# Patient Record
Sex: MALE | Race: BLACK OR AFRICAN AMERICAN | NOT HISPANIC OR LATINO | URBAN - METROPOLITAN AREA
[De-identification: names, ages, dates, MRNs, and addresses within clinical notes are randomized per-mention and may not be internally consistent; named-entity substitution may affect disease eponyms.]

---

## 2018-07-08 ENCOUNTER — EMERGENCY (EMERGENCY)
Facility: HOSPITAL | Age: 43
LOS: 1 days | Discharge: HOME | End: 2018-07-08
Attending: EMERGENCY MEDICINE | Admitting: EMERGENCY MEDICINE
Payer: COMMERCIAL

## 2018-07-08 VITALS
RESPIRATION RATE: 18 BRPM | SYSTOLIC BLOOD PRESSURE: 113 MMHG | TEMPERATURE: 98 F | DIASTOLIC BLOOD PRESSURE: 75 MMHG | HEART RATE: 73 BPM | OXYGEN SATURATION: 97 %

## 2018-07-08 DIAGNOSIS — R41.82 ALTERED MENTAL STATUS, UNSPECIFIED: ICD-10-CM

## 2018-07-08 DIAGNOSIS — F10.120 ALCOHOL ABUSE WITH INTOXICATION, UNCOMPLICATED: ICD-10-CM

## 2018-07-08 DIAGNOSIS — R46.0 VERY LOW LEVEL OF PERSONAL HYGIENE: ICD-10-CM

## 2018-07-08 PROCEDURE — 99284 EMERGENCY DEPT VISIT MOD MDM: CPT | Mod: 25

## 2018-07-08 PROCEDURE — 70450 CT HEAD/BRAIN W/O DYE: CPT | Mod: 26

## 2018-07-08 NOTE — ED PROVIDER NOTE - ENMT, MLM
Airway patent, Nasal mucosa clear. Mouth with normal mucosa. Throat has no vesicles, no oropharyngeal exudates and uvula is midline. No Matthews's/raccoon's.

## 2018-07-08 NOTE — ED ADULT NURSE NOTE - OBJECTIVE STATEMENT
teresa brewer[place] awakens to loud stimuli and drifts off to sleep quickly, no s/s of distress, will continue to monitor

## 2018-07-08 NOTE — ED PROVIDER NOTE - PROGRESS NOTE DETAILS
Pt somnolent, arousable to voice, will continue to observe pending clinical sobriety. S/O to Dr. Castillo at 0800.

## 2018-07-08 NOTE — ED PROVIDER NOTE - MEDICAL DECISION MAKING DETAILS
will observe pending clinical sobriety. will observe pending clinical sobriety.    Strict prompt return precautions to ER discussed for any worsening or new sypmtoms, pt verbalized understanding. The patient tolerated PO fluids. The patient's symptoms progressively improved throughout the ED stay. At the time of discharge from the Emergency Department, the patient is alert with fluent appropriate speech and ambulatory without difficulty.  A medical screening examination was performed and no emergency medical condition was identified.

## 2018-09-23 ENCOUNTER — EMERGENCY (EMERGENCY)
Facility: HOSPITAL | Age: 43
LOS: 1 days | Discharge: HOME | End: 2018-09-23
Attending: EMERGENCY MEDICINE | Admitting: EMERGENCY MEDICINE
Payer: COMMERCIAL

## 2018-09-23 VITALS
OXYGEN SATURATION: 100 % | DIASTOLIC BLOOD PRESSURE: 97 MMHG | SYSTOLIC BLOOD PRESSURE: 137 MMHG | RESPIRATION RATE: 18 BRPM | HEART RATE: 82 BPM | TEMPERATURE: 98 F

## 2018-09-23 VITALS
TEMPERATURE: 98 F | OXYGEN SATURATION: 99 % | HEART RATE: 88 BPM | SYSTOLIC BLOOD PRESSURE: 115 MMHG | DIASTOLIC BLOOD PRESSURE: 85 MMHG | RESPIRATION RATE: 16 BRPM

## 2018-09-23 DIAGNOSIS — R41.82 ALTERED MENTAL STATUS, UNSPECIFIED: ICD-10-CM

## 2018-09-23 DIAGNOSIS — F10.129 ALCOHOL ABUSE WITH INTOXICATION, UNSPECIFIED: ICD-10-CM

## 2018-09-23 PROCEDURE — 99236 HOSP IP/OBS SAME DATE HI 85: CPT

## 2018-09-23 NOTE — ED CDU PROVIDER DISPOSITION NOTE - CLINICAL COURSE
Patient BIBA for public intoxication.  Slept, awoke, ate, walked around.  No trauma.  No complaints.  Requesting discharge.  Ambulatory with steady gait and balance.  Alert/oriented x 4.

## 2018-09-23 NOTE — ED ADULT NURSE NOTE - NSIMPLEMENTINTERV_GEN_ALL_ED
Implemented All Fall Risk Interventions:  [place] to call system. Call bell, personal items and telephone within reach. Instruct patient to call for assistance. Room bathroom lighting operational. Non-slip footwear when patient is off stretcher. Physically safe environment: no spills, clutter or unnecessary equipment. Stretcher in lowest position, wheels locked, appropriate side rails in place. Provide visual cue, wrist band, yellow gown, etc. Monitor gait and stability. Monitor for mental status changes and reorient to person, place, and time. Review medications for side effects contributing to fall risk. Reinforce activity limits and safety measures with patient and family.

## 2018-09-23 NOTE — ED ADULT NURSE NOTE - WEIGHT IN LBS
Refill Authorization Note     is requesting a refill authorization.    Brief assessment and rationale for refill: QUICK DC: both RTS 2/19  Amount/Quantity of medication ordered: 90d        Refills Authorized: No              Medication Therapy Plan: quick dc both RTS 2/19  Name and strength of medication: CLOPIDOGREL 75 MG Tablet/LOSARTAN POTASSIUM 100 MG Tablet  How patient will take medication: utd  Medication reconciliation completed: No  Comments:   
179.8

## 2018-09-23 NOTE — ED PROVIDER NOTE - OBJECTIVE STATEMENT
alcohol intoxication BIBEMS, patient is unable to stand without assistance, is unsafe at this time and will admit to CDU and offer SBIRT services when sober

## 2018-09-23 NOTE — ED ADULT NURSE NOTE - CHPI ED NUR SYMPTOMS NEG
no chills/no decreased eating/drinking/no dizziness/no fever/no nausea/no pain/no tingling/no vomiting/no weakness

## 2018-09-23 NOTE — ED ADULT NURSE NOTE - OBJECTIVE STATEMENT
here for AMS, found on the street drinking- vitals stable, presents with no obvious injury or trauma

## 2020-02-29 ENCOUNTER — EMERGENCY (EMERGENCY)
Facility: HOSPITAL | Age: 45
LOS: 1 days | Discharge: HOME | End: 2020-02-29
Admitting: EMERGENCY MEDICINE
Payer: COMMERCIAL

## 2020-02-29 VITALS
SYSTOLIC BLOOD PRESSURE: 128 MMHG | TEMPERATURE: 98 F | HEART RATE: 69 BPM | RESPIRATION RATE: 16 BRPM | HEIGHT: 74 IN | DIASTOLIC BLOOD PRESSURE: 93 MMHG | OXYGEN SATURATION: 97 % | WEIGHT: 198.42 LBS

## 2020-02-29 PROCEDURE — 99283 EMERGENCY DEPT VISIT LOW MDM: CPT

## 2020-02-29 PROCEDURE — 73110 X-RAY EXAM OF WRIST: CPT | Mod: 26,LT

## 2020-02-29 NOTE — ED ADULT NURSE NOTE - SUICIDE SCREENING QUESTION 1
Other (Free Text): Declines rx Note Text (......Xxx Chief Complaint.): This diagnosis correlates with the Detail Level: Detailed Patient refused

## 2020-02-29 NOTE — ED ADULT NURSE NOTE - OBJECTIVE STATEMENT
45 y/o M c/o L wrist pain. Pt very uncooperative during exam and changing story of how injury occurred. Pt told triage RN he fell onto subway tracks and is now telling RN he bumped his wrist into the wall. No obvious swelling/deformity. Pt unwilling to answer questions and provide PMH. Pt very lethargic and irritable.

## 2020-02-29 NOTE — ED PROVIDER NOTE - PHYSICAL EXAMINATION
VITAL SIGNS: I have reviewed nursing notes and confirm.  CONSTITUTIONAL: Well-developed; well-nourished; in no acute distress.  SKIN: Skin is warm and dry, no acute rash.  HEAD: Normocephalic; atraumatic.  EYES: PERRL, EOM intact; conjunctiva and sclera clear.  ENT: No nasal discharge; airway clear.  NECK: Supple; non tender.  CARD: S1, S2 normal; no murmurs, gallops, or rubs. Regular rate and rhythm.  RESP: No wheezes, rales or rhonchi.  ABD: Normal bowel sounds; soft; non-distended; non-tender;  no palpable or pulsating mass; no hepatosplenomegaly.  EXT: left wrist: no swelling, no erythema, ROM intact, NVI, no scaphoid tenderness  OTHER: Normal ROM. No clubbing, cyanosis or edema.  NEURO: Patient is alert, oriented x person, place and time.  Cranial nerves 2-12 are intact.  Normal gait and speech.  Cerebellar testing normal:  negative Romberg, normal coordination and normal finger to nose, heal to shin and rapid alternating movements.  Normal proprioception and sensory exam.  No pronator drift.  5/5 bl upper extremity and lower extremity strength.  PSYCH: Cooperative, appropriate.

## 2020-02-29 NOTE — ED PROVIDER NOTE - PATIENT PORTAL LINK FT
You can access the FollowMyHealth Patient Portal offered by [facility] by registering at the following website: [email or web address]. By joining [employer]’s FollowMyHealth portal, you will also be able to view your health information using other applications (apps) compatible with our system.

## 2020-02-29 NOTE — ED PROVIDER NOTE - OBJECTIVE STATEMENT
44 year old male with no PMhx presents with left wrist pain x couple hours. reports "fell on train tracks." also just states he wants to sleep.   Denies head trauma, LOC, break in the skin, paresthesia, numbness, tingling, redness, bleeding, d/c, HA, dizziness, SOB, CP, palpitations, N/V, focal weakness, neck/back pain, and malaise. Pt is ambulatory s/p fall
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
## 2020-03-04 DIAGNOSIS — M25.532 PAIN IN LEFT WRIST: ICD-10-CM
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
## 2020-03-04 DIAGNOSIS — S60.212A CONTUSION OF LEFT WRIST, INITIAL ENCOUNTER: ICD-10-CM
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
## 2022-03-25 ENCOUNTER — EMERGENCY (EMERGENCY)
Facility: HOSPITAL | Age: 47
LOS: 1 days | Discharge: HOME | End: 2022-03-25
Attending: EMERGENCY MEDICINE
Payer: SELF-PAY

## 2022-03-25 VITALS
OXYGEN SATURATION: 94 % | SYSTOLIC BLOOD PRESSURE: 166 MMHG | DIASTOLIC BLOOD PRESSURE: 104 MMHG | RESPIRATION RATE: 18 BRPM | HEART RATE: 68 BPM | WEIGHT: 199.96 LBS

## 2022-03-25 VITALS
RESPIRATION RATE: 17 BRPM | DIASTOLIC BLOOD PRESSURE: 69 MMHG | OXYGEN SATURATION: 100 % | TEMPERATURE: 98 F | HEART RATE: 70 BPM | SYSTOLIC BLOOD PRESSURE: 104 MMHG

## 2022-03-25 PROCEDURE — 99285 EMERGENCY DEPT VISIT HI MDM: CPT

## 2022-03-25 PROCEDURE — 82962 GLUCOSE BLOOD TEST: CPT

## 2022-03-25 PROCEDURE — 93005 ELECTROCARDIOGRAM TRACING: CPT

## 2022-03-25 PROCEDURE — 99284 EMERGENCY DEPT VISIT MOD MDM: CPT

## 2022-03-25 NOTE — ED ADULT NURSE NOTE - NSIMPLEMENTINTERV_GEN_ALL_ED
Implemented All Fall Risk Interventions:  [place] to call system. Call bell, personal items and telephone within reach. Instruct patient to call for assistance. Room bathroom lighting operational. Non-slip footwear when patient is off stretcher. Physically safe environment: no spills, clutter or unnecessary equipment. Stretcher in lowest position, wheels locked, appropriate side rails in place. Provide visual cue, wrist band, yellow gown, etc. Monitor gait and stability. Monitor for mental status changes and reorient to person, place, and time. Review medications for side effects contributing to fall risk. Reinforce activity limits and safety measures with patient and family.

## 2022-03-25 NOTE — ED PROVIDER NOTE - PATIENT PORTAL LINK FT
You can access the FollowMyHealth Patient Portal offered by [facility] by registering at the following website: [email or web address]. By joining [employer]’s FollowMyHealth portal, you will also be able to view your health information using other applications (apps) compatible with our system.

## 2022-03-25 NOTE — ED PROVIDER NOTE - CLINICAL SUMMARY MEDICAL DECISION MAKING FREE TEXT BOX
47y/o M w/ unknown medical history found unresponsive smelling of alcohol not alert unable to walk but became acutely agitated requiring sedation. Pupils 4mm and reactive to light, fits Alcohol toxidrome. Plan finger stick, metabolize to freedom, reassess, ambulate and obtain additional history when sober 45y/o M w/ unknown medical history found unresponsive smelling of alcohol not alert unable to walk but became acutely agitated requiring sedation. Pupils 4mm and reactive to light, fits alcohol toxidrome. Plan finger stick, metabolize to freedom, reassess, ambulate and obtain additional history when sober.

## 2022-03-25 NOTE — ED PROVIDER NOTE - PHYSICAL EXAMINATION
Gen: non-toxic appearing  Head: normal appearing  HEENT: normal conjunctiva, oral mucosa moist  Lung: no respiratory distress, CTA b/l     CV: regular rate and rhythm, no murmurs  Abd: soft, non distended, non tender   MSK: no visible deformities  Neuro: No focal deficits, AAOx3  Skin: Warm  Psych: normal affect Gen: non-toxic appearing  Head: normal appearing  HEENT: normal conjunctiva, oral mucosa moist  Lung: no respiratory distress, CTA b/l  CV: regular rate and rhythm, no murmurs  Abd: soft, non distended, non tender   MSK: no visible deformities  Neuro: No focal deficits, PERRL, AAOx0  Skin: Warm  Psych: normal affect

## 2022-03-25 NOTE — ED ADULT NURSE NOTE - OBJECTIVE STATEMENT
Pt is a 46 yr old male with unknown medical hx brought in by ems for intox. Pt was apparently found outside- intoxicated and combative with ems/PD. PD cuffed the pt and ems gave him 5 IM Versed. Pt came into the ED asleep from medications but still arousable to painful stimuli. Pt placed on 2L NC and placed on enhanced supervision.

## 2022-03-25 NOTE — ED PROVIDER NOTE - ATTENDING CONTRIBUTION TO CARE
attending Krys: 46yM BIBEMS after being found down at work, smelling of alcohol. After EMS arrival, pt became acutely agitated, required 5mg intranasal versed for sedation at 19:30. No evidence of trauma.  Exam as above. Will obtain finger stick, place on enhanced supervision, observe for sobriety

## 2022-03-25 NOTE — ED ADULT NURSE REASSESSMENT NOTE - NS ED NURSE REASSESS COMMENT FT1
Report received from KARIME Cook. Patient resting in stretcher in green hallway 29.5. A&Ox4. Patient sleeping but easily arousable. No IV placed. Denies any pain. Stretcher in lowest position, side rails up. Patient instructed to notify RN if assistance is needed. Awaiting disposition.

## 2022-03-25 NOTE — ED PROVIDER NOTE - PROGRESS NOTE DETAILS
Case EM/IM PGY4: Pt reassessed, now awake and responsive. Ambulating without difficulty. Speaking fluently concerned about not having his phone. Refusing PO challenge at this time. Pt medically stabilized for discharge at this time with strict return precautions.

## 2022-03-25 NOTE — ED PROVIDER NOTE - NSFOLLOWUPINSTRUCTIONS_ED_ALL_ED_FT
You were brought to the hospital due to concern for alcohol intoxication. You were observed in the ER until you were awake and feeling better. Follow-up with your primary care doctor within a week to discuss your ER visit and for further evaluation and management. Return to the ER for any new or worsening fevers/chills, headache, vision changes, neck pain, back pain, chest pain, difficulty breathing, abdominal pain, vomiting/inability to eat, numbness, weakness or any other new or concerning symptoms.

## 2022-03-26 NOTE — ED ADULT NURSE REASSESSMENT NOTE - NS ED NURSE REASSESS COMMENT FT1
Patient awake and oriented to person, place, and time. Patient able to tolerate PO. Ambulating in ED with steady gait. As per MD Willoughby, patient is okay for discharge. Patient states that he doesn't have his 2 phones. As per KARIME Latham, patient did not arrive to ED with hoodie and 2 cellphones that he is looking for. Patient is discharged.

## 2023-02-02 NOTE — ED CDU PROVIDER INITIAL DAY NOTE - NSTIMEPROVIDERCAREINITIATE_GEN_ER
Requested Prescriptions     Pending Prescriptions Disp Refills    predniSONE (STERAPRED) 5 mg dose pack 21 Tablet 0     Sig: See administration instruction per 5mg dose pack    Last fill 10/26/22   Last visit 1/11/23   Next visit 4/5/23
23-Sep-2018 02:59

## 2023-07-25 ENCOUNTER — EMERGENCY (EMERGENCY)
Facility: HOSPITAL | Age: 48
LOS: 1 days | Discharge: HOME | End: 2023-07-25
Attending: EMERGENCY MEDICINE | Admitting: EMERGENCY MEDICINE
Payer: SELF-PAY

## 2023-07-25 VITALS
RESPIRATION RATE: 19 BRPM | HEART RATE: 63 BPM | DIASTOLIC BLOOD PRESSURE: 96 MMHG | OXYGEN SATURATION: 96 % | SYSTOLIC BLOOD PRESSURE: 150 MMHG | WEIGHT: 184.09 LBS | TEMPERATURE: 98 F

## 2023-07-25 VITALS
SYSTOLIC BLOOD PRESSURE: 148 MMHG | DIASTOLIC BLOOD PRESSURE: 88 MMHG | OXYGEN SATURATION: 98 % | HEART RATE: 67 BPM | RESPIRATION RATE: 16 BRPM

## 2023-07-25 DIAGNOSIS — R10.13 EPIGASTRIC PAIN: ICD-10-CM

## 2023-07-25 DIAGNOSIS — R10.12 LEFT UPPER QUADRANT PAIN: ICD-10-CM

## 2023-07-25 DIAGNOSIS — Z88.6 ALLERGY STATUS TO ANALGESIC AGENT: ICD-10-CM

## 2023-07-25 DIAGNOSIS — F17.200 NICOTINE DEPENDENCE, UNSPECIFIED, UNCOMPLICATED: ICD-10-CM

## 2023-07-25 DIAGNOSIS — Z88.0 ALLERGY STATUS TO PENICILLIN: ICD-10-CM

## 2023-07-25 LAB
ALBUMIN SERPL ELPH-MCNC: 3.6 G/DL — SIGNIFICANT CHANGE UP (ref 3.4–5)
ALP SERPL-CCNC: 63 U/L — SIGNIFICANT CHANGE UP (ref 40–120)
ALT FLD-CCNC: 19 U/L — SIGNIFICANT CHANGE UP (ref 12–42)
ANION GAP SERPL CALC-SCNC: 10 MMOL/L — SIGNIFICANT CHANGE UP (ref 9–16)
AST SERPL-CCNC: 19 U/L — SIGNIFICANT CHANGE UP (ref 15–37)
BASOPHILS # BLD AUTO: 0.01 K/UL — SIGNIFICANT CHANGE UP (ref 0–0.2)
BASOPHILS NFR BLD AUTO: 0.2 % — SIGNIFICANT CHANGE UP (ref 0–2)
BILIRUB SERPL-MCNC: 0.5 MG/DL — SIGNIFICANT CHANGE UP (ref 0.2–1.2)
BUN SERPL-MCNC: 13 MG/DL — SIGNIFICANT CHANGE UP (ref 7–23)
CALCIUM SERPL-MCNC: 8.5 MG/DL — SIGNIFICANT CHANGE UP (ref 8.5–10.5)
CHLORIDE SERPL-SCNC: 104 MMOL/L — SIGNIFICANT CHANGE UP (ref 96–108)
CO2 SERPL-SCNC: 25 MMOL/L — SIGNIFICANT CHANGE UP (ref 22–31)
CREAT SERPL-MCNC: 0.93 MG/DL — SIGNIFICANT CHANGE UP (ref 0.5–1.3)
EGFR: 101 ML/MIN/1.73M2 — SIGNIFICANT CHANGE UP
EOSINOPHIL # BLD AUTO: 0.09 K/UL — SIGNIFICANT CHANGE UP (ref 0–0.5)
EOSINOPHIL NFR BLD AUTO: 2.2 % — SIGNIFICANT CHANGE UP (ref 0–6)
GLUCOSE SERPL-MCNC: 92 MG/DL — SIGNIFICANT CHANGE UP (ref 70–99)
HCT VFR BLD CALC: 38.5 % — LOW (ref 39–50)
HGB BLD-MCNC: 12.8 G/DL — LOW (ref 13–17)
IMM GRANULOCYTES NFR BLD AUTO: 0 % — SIGNIFICANT CHANGE UP (ref 0–0.9)
LIDOCAIN IGE QN: 86 U/L — SIGNIFICANT CHANGE UP (ref 73–393)
LYMPHOCYTES # BLD AUTO: 2.18 K/UL — SIGNIFICANT CHANGE UP (ref 1–3.3)
LYMPHOCYTES # BLD AUTO: 52.4 % — HIGH (ref 13–44)
MCHC RBC-ENTMCNC: 30.2 PG — SIGNIFICANT CHANGE UP (ref 27–34)
MCHC RBC-ENTMCNC: 33.2 GM/DL — SIGNIFICANT CHANGE UP (ref 32–36)
MCV RBC AUTO: 90.8 FL — SIGNIFICANT CHANGE UP (ref 80–100)
MONOCYTES # BLD AUTO: 0.34 K/UL — SIGNIFICANT CHANGE UP (ref 0–0.9)
MONOCYTES NFR BLD AUTO: 8.2 % — SIGNIFICANT CHANGE UP (ref 2–14)
NEUTROPHILS # BLD AUTO: 1.54 K/UL — LOW (ref 1.8–7.4)
NEUTROPHILS NFR BLD AUTO: 37 % — LOW (ref 43–77)
NRBC # BLD: 0 /100 WBCS — SIGNIFICANT CHANGE UP (ref 0–0)
PLATELET # BLD AUTO: 210 K/UL — SIGNIFICANT CHANGE UP (ref 150–400)
POTASSIUM SERPL-MCNC: 3.6 MMOL/L — SIGNIFICANT CHANGE UP (ref 3.5–5.3)
POTASSIUM SERPL-SCNC: 3.6 MMOL/L — SIGNIFICANT CHANGE UP (ref 3.5–5.3)
PROT SERPL-MCNC: 7 G/DL — SIGNIFICANT CHANGE UP (ref 6.4–8.2)
RBC # BLD: 4.24 M/UL — SIGNIFICANT CHANGE UP (ref 4.2–5.8)
RBC # FLD: 12.2 % — SIGNIFICANT CHANGE UP (ref 10.3–14.5)
SODIUM SERPL-SCNC: 139 MMOL/L — SIGNIFICANT CHANGE UP (ref 132–145)
TROPONIN I, HIGH SENSITIVITY RESULT: <4 NG/L — SIGNIFICANT CHANGE UP
WBC # BLD: 4.16 K/UL — SIGNIFICANT CHANGE UP (ref 3.8–10.5)
WBC # FLD AUTO: 4.16 K/UL — SIGNIFICANT CHANGE UP (ref 3.8–10.5)

## 2023-07-25 PROCEDURE — 99284 EMERGENCY DEPT VISIT MOD MDM: CPT

## 2023-07-25 RX ORDER — OMEPRAZOLE 10 MG/1
1 CAPSULE, DELAYED RELEASE ORAL
Qty: 28 | Refills: 0
Start: 2023-07-25 | End: 2023-08-07

## 2023-07-25 RX ORDER — SUCRALFATE 1 G
1 TABLET ORAL ONCE
Refills: 0 | Status: COMPLETED | OUTPATIENT
Start: 2023-07-25 | End: 2023-07-25

## 2023-07-25 RX ORDER — FAMOTIDINE 10 MG/ML
20 INJECTION INTRAVENOUS ONCE
Refills: 0 | Status: COMPLETED | OUTPATIENT
Start: 2023-07-25 | End: 2023-07-25

## 2023-07-25 RX ADMIN — Medication 30 MILLILITER(S): at 21:45

## 2023-07-25 RX ADMIN — Medication 1 GRAM(S): at 21:44

## 2023-07-25 RX ADMIN — FAMOTIDINE 20 MILLIGRAM(S): 10 INJECTION INTRAVENOUS at 21:44

## 2023-07-25 NOTE — ED PROVIDER NOTE - PROGRESS NOTE DETAILS
Patient states that his symptoms have improved. Will dc with RX. Conservative management discussed with the patient in detail.  Close PMD follow up encouraged.  Strict ED return instructions discussed in detail and patient given the opportunity to ask any questions about their discharge diagnosis and instructions

## 2023-07-25 NOTE — ED PROVIDER NOTE - PHYSICAL EXAMINATION
PE: Well-appearing, no acute distress, nonlabored respirations, abdomen soft/nontender/nondistended.

## 2023-07-25 NOTE — ED ADULT NURSE NOTE - NSFALLUNIVINTERV_ED_ALL_ED
Bed/Stretcher in lowest position, wheels locked, appropriate side rails in place/Call bell, personal items and telephone in reach/Instruct patient to call for assistance before getting out of bed/chair/stretcher/Non-slip footwear applied when patient is off stretcher/[place] to call system/Physically safe environment - no spills, clutter or unnecessary equipment/Purposeful proactive rounding/Room/bathroom lighting operational, light cord in reach

## 2023-07-25 NOTE — ED PROVIDER NOTE - PATIENT PORTAL LINK FT
You can access the FollowMyHealth Patient Portal offered by [facility] by registering at the following website: [email or web address]. By joining [employer]’s FollowMyHealth portal, you will also be able to view your health information using other applications (apps) compatible with our system.

## 2023-07-25 NOTE — ED PROVIDER NOTE - CLINICAL SUMMARY MEDICAL DECISION MAKING FREE TEXT BOX
47-year-old male no significant past medical history presents with 1 month of fluctuating left upper quadrant tightness/aching noted to be more significant after eating, nonradiating, not associated with nausea/vomiting/diarrhea/fevers, moderate in severity at its worst.  Patient reports that he has a poor diet consisting of spicy foods and heavy alcohol use and cigarettes on the weekends.  He has not been taking any medications to assist with his symptoms.    PE: Well-appearing, no acute distress, nonlabored respirations, abdomen soft/nontender/nondistended.    MDM: Patient presents with signs and symptoms consistent with gastritis/PUD/GERD likely due to dietary and lifestyle choices.  We will screen with labs and attempt symptom control.  Patient has good response to medications will discharge with prescription and recommendation to follow-up with his PMD for possible GI referral.

## 2023-07-25 NOTE — ED PROVIDER NOTE - OBJECTIVE STATEMENT
47-year-old male no significant past medical history presents with 1 month of fluctuating left upper quadrant tightness/aching noted to be more significant after eating, nonradiating, not associated with nausea/vomiting/diarrhea/fevers, moderate in severity at its worst.  Patient reports that he has a poor diet consisting of spicy foods and heavy alcohol use and cigarettes on the weekends.  He has not been taking any medications to assist with his symptoms.

## 2023-07-25 NOTE — ED PROVIDER NOTE - NSFOLLOWUPINSTRUCTIONS_ED_ALL_ED_FT
Please read all handouts provided to you from the emergency department. Seek immediate medical attention for any new/worsening signs or symptoms.  Take any prescribed medications as directed. Please follow up with  your primary physician in the next 3-5 days.     If you would like to see a Gastroenterologist, please call one of the offices listed below:    [facility]  [address] [place] [address]  [phone]    Medicine Specialties at [address]  [address], [address] [place] [id number]  [phone]       ABDOMINAL PAIN    What causes stomach aches?    In some cases, stomach aches are caused by a specific problem, such as:  - A stomach ulcer, which is a sore on the inside of the stomach  - A condition called "diverticulitis," in which small pouches in your large intestine get infected    In other cases, doctors do not know what causes stomach aches or the other symptoms that happen with them. Even so, there are usually ways to treat the symptoms of stomach ache.      What treatments help with stomach symptoms?    If your symptoms are caused by a specific problem, such as an ulcer, treating that problem will likely relieve your symptoms. But if your doctor or nurse does not know what is causing your pain, they might recommend medicines that reduce the amount of acid in your stomach. These medicines often relieve stomach ache and the symptoms that come with it. Some of these medicines are available without a prescription.      Can I do anything on my own to prevent stomach ache?  Yes. The foods you eat and the way you eat them can have a big effect on whether or not you feel pain.    To lower your chances of getting a stomach ache:  - Avoid fatty foods, such as red meat, butter, fried foods, and cheese  - Eat a bunch of small meals each day, rather than 2 or 3 big meals  - Stay away from foods that seem to make your symptoms worse  - Avoid taking over-the-counter medicines that seem to make your symptoms worse – Examples include aspirin or ibuprofen (sample brand names: Advil, Motrin).    Some people – especially kids – sometimes get a stomach ache after drinking milk or eating cheese, ice cream, or other foods that have milk in them. They have a problem called "lactose intolerance," which means that they cannot fully break down foods that have milk in them.    People with lactose intolerance can avoid problems caused by milk if they take something called lactase. Lactase (sample brand name: Lactaid) helps your body break down milk. Some foods come with it already added.    If your stomach ache seems to be related to constipation, meaning that you do not have enough bowel movements, you might need more fiber or a medicine called a laxative. (Laxatives are medicines that increase the number of bowel movements you have.)    Taking in a lot of fiber helps to increase the number of bowel movements you have. You can get more fiber by:  - Eating plenty of fruits, vegetables, and whole grains  - Taking fiber pills, powders, or wafers

## 2023-07-25 NOTE — ED ADULT TRIAGE NOTE - MEANS OF ARRIVAL
[facility] report for this patient personally reviewed. Requested medication, Vyvanse 20 mg, most recently dispensed: 6/4/22  Most recent well child exam: 8/6/21  Most recent medication check: 3/2/22  Next visit due: 8/2022    Prescription electronically sent to:  [facility] [address]
ambulatory

## 2024-01-29 ENCOUNTER — EMERGENCY (EMERGENCY)
Facility: HOSPITAL | Age: 49
LOS: 1 days | Discharge: HOME | End: 2024-01-29
Admitting: EMERGENCY MEDICINE
Payer: COMMERCIAL

## 2024-01-29 VITALS
SYSTOLIC BLOOD PRESSURE: 142 MMHG | TEMPERATURE: 99 F | OXYGEN SATURATION: 98 % | WEIGHT: 194.01 LBS | DIASTOLIC BLOOD PRESSURE: 90 MMHG | HEIGHT: 74 IN | HEART RATE: 66 BPM | RESPIRATION RATE: 17 BRPM

## 2024-01-29 DIAGNOSIS — M25.562 PAIN IN LEFT KNEE: ICD-10-CM
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
## 2024-01-29 DIAGNOSIS — Z23 ENCOUNTER FOR IMMUNIZATION: ICD-10-CM

## 2024-01-29 DIAGNOSIS — M70.42 PREPATELLAR BURSITIS, LEFT KNEE: ICD-10-CM
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
## 2024-01-29 DIAGNOSIS — Z88.6 ALLERGY STATUS TO ANALGESIC AGENT: ICD-10-CM

## 2024-01-29 DIAGNOSIS — S80.02XA CONTUSION OF LEFT KNEE, INITIAL ENCOUNTER: ICD-10-CM

## 2024-01-29 PROCEDURE — 73562 X-RAY EXAM OF KNEE 3: CPT | Mod: 26,LT

## 2024-01-29 PROCEDURE — 99284 EMERGENCY DEPT VISIT MOD MDM: CPT

## 2024-01-29 RX ORDER — ACETAMINOPHEN 500 MG
975 TABLET ORAL ONCE
Refills: 0 | Status: COMPLETED | OUTPATIENT
Start: 2024-01-29 | End: 2024-01-29

## 2024-01-29 RX ORDER — CEPHALEXIN 500 MG
1 CAPSULE ORAL
Qty: 40 | Refills: 0
Start: 2024-01-29 | End: 2024-02-07

## 2024-01-29 RX ORDER — TETANUS TOXOID, REDUCED DIPHTHERIA TOXOID AND ACELLULAR PERTUSSIS VACCINE, ADSORBED 5; 2.5; 8; 8; 2.5 [IU]/.5ML; [IU]/.5ML; UG/.5ML; UG/.5ML; UG/.5ML
0.5 SUSPENSION INTRAMUSCULAR ONCE
Refills: 0 | Status: COMPLETED | OUTPATIENT
Start: 2024-01-29 | End: 2024-01-29

## 2024-01-29 RX ORDER — ACETAMINOPHEN 500 MG
2 TABLET ORAL
Qty: 56 | Refills: 0
Start: 2024-01-29 | End: 2024-02-04

## 2024-01-29 RX ORDER — CEPHALEXIN 500 MG
500 CAPSULE ORAL ONCE
Refills: 0 | Status: COMPLETED | OUTPATIENT
Start: 2024-01-29 | End: 2024-01-29

## 2024-01-29 RX ADMIN — Medication 500 MILLIGRAM(S): at 23:38

## 2024-01-29 RX ADMIN — Medication 975 MILLIGRAM(S): at 21:37

## 2024-01-29 RX ADMIN — TETANUS TOXOID, REDUCED DIPHTHERIA TOXOID AND ACELLULAR PERTUSSIS VACCINE, ADSORBED 0.5 MILLILITER(S): 5; 2.5; 8; 8; 2.5 SUSPENSION INTRAMUSCULAR at 23:03

## 2024-01-29 RX ADMIN — Medication 1 TABLET(S): at 23:38

## 2024-01-29 NOTE — ED PROVIDER NOTE - PATIENT PORTAL LINK FT
You can access the FollowMyHealth Patient Portal offered by [facility] by registering at the following website: [email or web address]. By joining [employer]’s FollowMyHealth portal, you will also be able to view your health information using other applications (apps) compatible with our system.

## 2024-01-29 NOTE — ED ADULT NURSE NOTE - NSFALLUNIVINTERV_ED_ALL_ED
Bed/Stretcher in lowest position, wheels locked, appropriate side rails in place/Call bell, personal items and telephone in reach/Instruct patient to call for assistance before getting out of bed/chair/stretcher/Non-slip footwear applied when patient is off stretcher/[place] to call system/Physically safe environment - no spills, clutter or unnecessary equipment/Purposeful proactive rounding/Room/bathroom lighting operational, light cord in reach

## 2024-01-29 NOTE — ED PROVIDER NOTE - CLINICAL SUMMARY MEDICAL DECISION MAKING FREE TEXT BOX
well appearing pt here with L knee pain and swelling over the L anterior bursa with ttp over the L anterior knee, able to bare weight and extend but decreased ROM with L knee flexion, reports that initially after the inj was able to have good ROM in L knee but worse starting over the last 2 d. concerned for fx, additionally peripatellar bursitis traumatic vs early infectious, plan: xr, abx, follow up with ortho

## 2024-01-29 NOTE — ED ADULT TRIAGE NOTE - CHIEF COMPLAINT QUOTE
Pt presents to ed reporting left sided knee pain x 1 wk and half with worsening pain/and swelling, pt also reports limited ROM. pain worse with movement

## 2024-01-29 NOTE — ED PROVIDER NOTE - PHYSICAL EXAMINATION
Physical Exam    Vital Signs: I have reviewed the initial vital signs.  Constitutional: well-appearing, appears stated age  Cardiovascular: regular rate, regular rhythm, well-perfused extremities  Musculoskeletal: +L knee decreased flexion, +swelling and ttp over the L knee no erythema, no fluctuance, but prepatellar bursa boggy and ttp   Integumentary: warm, dry, no rash  Neurologic: LE extremities’ motor and sensory functions grossly intact

## 2024-01-29 NOTE — ED ADULT NURSE NOTE - OBJECTIVE STATEMENT
pt reports left knee pain, states recent injury to affected area; pain worse during ambulation; pt alert & oriented x4, in no acute distress

## 2024-01-29 NOTE — ED PROVIDER NOTE - CARE PROVIDER_API CALL
Saad Shepard  Orthopaedic Surgery  [address] [place] [address], [address]  [place] [address]  Phone: [phone]  Fax: [phone]  Follow Up Time:

## 2024-01-29 NOTE — ED PROVIDER NOTE - NSFOLLOWUPINSTRUCTIONS_ED_ALL_ED_FT
[facility]  [phone]          Knee Sprain    WHAT YOU NEED TO KNOW:    A knee sprain occurs when one or more ligaments in your knee are suddenly stretched or torn. Ligaments are tissues that hold bones together. Ligaments support the knee and keep the joint and bones in the correct position. Knee Anatomy          DISCHARGE INSTRUCTIONS:    Return to the emergency department if:     Any part of your leg feels cold, numb, or looks pale         Contact your healthcare provider if:     You have new or increased swelling, bruising, or pain in your knee.      Your symptoms do not improve within 6 weeks, even with treatment.      You have questions or concerns about your condition or care.     Medicines:     NSAIDs, such as ibuprofen, help decrease swelling, pain, and fever. This medicine is available with or without a doctor's order. NSAIDs can cause stomach bleeding or kidney problems in certain people. If you take blood thinner medicine, always ask your healthcare provider if NSAIDs are safe for you. Always read the medicine label and follow directions.      Acetaminophen decreases pain and fever. It is available without a doctor's order. Ask how much to take and how often to take it. Follow directions. Read the labels of all other medicines you are using to see if they also contain acetaminophen, or ask your doctor or pharmacist. Acetaminophen can cause liver damage if not taken correctly. Do not use more than 4 grams (4,000 milligrams) total of acetaminophen in one day.       Prescription pain medicine may be given. Ask how to take this medicine safely.       Take your medicine as directed. Contact your healthcare provider if you think your medicine is not helping or if you have side effects. Tell him or her if you are allergic to any medicine. Keep a list of the medicines, vitamins, and herbs you take. Include the amounts, and when and why you take them. Bring the list or the pill bottles to follow-up visits. Carry your medicine list with you in case of an emergency.    Self-care:     Rest your knee and do not exercise. You may be told to keep weight off your knee. This means that you should not walk on your injured leg. Rest helps decrease swelling and allows the injury to heal. You can do gentle range of motion (ROM) exercises as directed. This will prevent stiffness.       Apply ice on your knee for 15 to 20 minutes every hour or as directed. Use an ice pack, or put crushed ice in a plastic bag. Cover it with a towel. Ice helps prevent tissue damage and decreases swelling and pain.      Apply compression to your knee as directed. You may need to wear an elastic bandage. This helps keep your injured knee from moving too much while it heals. You can loosen or tighten the elastic bandage to make it comfortable. It should be tight enough for you to feel support. It should not be so tight that it causes your toes to feel numb or tingly. If you are wearing an elastic bandage, take it off and rewrap it once a day.       Elevate your knee above the level of your heart as often as you can. This will help decrease swelling and pain. Prop your leg on pillows or blankets to keep it elevated comfortably. Do not put pillows directly behind your knee.       Use support devices as directed: Support devices such as a splint or brace may be needed. These devices limit movement and protect your joint while it heals. You may be given crutches to use until you can stand on your injured leg without pain. Use devices as directed.     Physical therapy: A physical therapist teaches you exercises to help improve movement and strength, and to decrease pain.     Prevent another knee sprain: Exercise your legs to keep your muscles strong. Strong leg muscles help protect your knee and prevent strain. The following may also prevent a knee sprain:     Slowly start your exercise or training program. Slowly increase the time, distance, and intensity of your exercise. Sudden increases in training may cause you to injure your knee again.       Wear protective braces and equipment as directed. Braces may prevent your knee from moving the wrong way and causing another sprain. Protective equipment may support your bones and ligaments to prevent injury.       Warm up and stretch before exercise. Warm up by walking or using an exercise bike before starting your regular exercise. Do gentle stretches after warming up. This helps to loosen your muscles and decrease stress on your knee. Cool down and stretch after you exercise.       Wear shoes that fit correctly and support your feet. Replace your running or exercise shoes before the padding or shock absorption is worn out. Ask your healthcare provider which exercise shoes are best for you. Ask if you should wear special shoe inserts. Shoe inserts can help support your heels and arches or keep your foot lined up correctly in your shoes. Exercise on flat surfaces.    Follow up with your healthcare provider as directed: Write down your questions so you remember to ask them during your visits.        © Copyright [employer] 2019 All illustrations and images included in [employer] are the copyrighted property of [employer] or [employer].

## 2024-01-29 NOTE — ED PROVIDER NOTE - OBJECTIVE STATEMENT
49 yo m pw worsening pain and decreased ROM in the L knee s/p fall 1 wk ago pt is able weight and ambulate, but reports that he has decreased flexion in the L knee but able to extend it, no fevers or chills. Reports an abrasion and swelling over the L patella.    I have reviewed available current nursing and previous documentation of past medical, surgical, family, and/or social history.

## 2024-06-10 NOTE — ED ADULT NURSE NOTE - FINAL NURSING ELECTRONIC SIGNATURE
08-Jul-2018 09:29
Is This A New Presentation, Or A Follow-Up?: Nail Disorder
How Severe Is It?: mild

## 2024-10-17 NOTE — ED ADULT NURSE NOTE - NS ED NURSE DC TEACHING
PAST MEDICAL HISTORY:  Backache symptom SCIATICA    CAD (coronary artery disease)     Diabetes mellitus, type 2     HLD (hyperlipidemia)     Hypertension      on foot

## 2024-12-19 NOTE — ED PROVIDER NOTE - CONDITION AT DISCHARGE:
Pt being referred to Endocrine for Hyperparathyroidism , recent hospitalization. Unable to schedule an appt. Please review and const pt to assist in scheduling. Thank you   Improved